# Patient Record
Sex: FEMALE | Race: WHITE
[De-identification: names, ages, dates, MRNs, and addresses within clinical notes are randomized per-mention and may not be internally consistent; named-entity substitution may affect disease eponyms.]

---

## 2022-07-12 NOTE — EKG
[facility]
                                    [address]
                                  [place]  [address]
_________________________________________________________________________________________
                                                                 Signed   
 
 
Normal sinus rhythm
Normal ECG
No previous ECGs available
Confirmed by MICHAEL GAN MD ([id number]) on 7/12/2022 12:56:12 PM
 
 
 
 
 
 
 
 
 
 
 
 
 
 
 
 
 
 
 
 
 
 
 
 
 
 
 
 
 
 
 
 
 
 
 
 
 
 
 
 
 
    Electronically Signed By: MICHAEL GAN MD  07/12/22 [id number]
_________________________________________________________________________________________
PATIENT NAME:     OLIVIER KIRBY                        
MEDICAL RECORD #: [id number]                     Electrocardiogram             
          ACCT #: [id number]  
DATE OF BIRTH:   [birth year]                                       
PHYSICIAN:   MICHAEL GAN MD           REPORT #: [id number]
REPORT IS CONFIDENTIAL AND NOT TO BE RELEASED WITHOUT AUTHORIZATION

## 2023-09-08 VITALS — DIASTOLIC BLOOD PRESSURE: 50 MMHG | SYSTOLIC BLOOD PRESSURE: 126 MMHG

## 2023-09-09 ENCOUNTER — HOSPITAL ENCOUNTER (OUTPATIENT)
Dept: HOSPITAL 46 - ED | Age: 88
Setting detail: OBSERVATION
LOS: 1 days | Discharge: HOME | End: 2023-09-10
Attending: INTERNAL MEDICINE | Admitting: INTERNAL MEDICINE
Payer: MEDICARE

## 2023-09-09 VITALS — SYSTOLIC BLOOD PRESSURE: 133 MMHG | DIASTOLIC BLOOD PRESSURE: 107 MMHG

## 2023-09-09 VITALS — SYSTOLIC BLOOD PRESSURE: 125 MMHG | DIASTOLIC BLOOD PRESSURE: 58 MMHG

## 2023-09-09 VITALS — SYSTOLIC BLOOD PRESSURE: 133 MMHG | DIASTOLIC BLOOD PRESSURE: 51 MMHG

## 2023-09-09 VITALS — DIASTOLIC BLOOD PRESSURE: 46 MMHG | SYSTOLIC BLOOD PRESSURE: 113 MMHG

## 2023-09-09 VITALS — DIASTOLIC BLOOD PRESSURE: 44 MMHG | SYSTOLIC BLOOD PRESSURE: 118 MMHG

## 2023-09-09 VITALS — HEIGHT: 62 IN | WEIGHT: 131.84 LBS | BODY MASS INDEX: 24.26 KG/M2

## 2023-09-09 VITALS — SYSTOLIC BLOOD PRESSURE: 139 MMHG | DIASTOLIC BLOOD PRESSURE: 94 MMHG

## 2023-09-09 VITALS — DIASTOLIC BLOOD PRESSURE: 59 MMHG | SYSTOLIC BLOOD PRESSURE: 127 MMHG

## 2023-09-09 VITALS — SYSTOLIC BLOOD PRESSURE: 126 MMHG | DIASTOLIC BLOOD PRESSURE: 50 MMHG

## 2023-09-09 VITALS — SYSTOLIC BLOOD PRESSURE: 126 MMHG | DIASTOLIC BLOOD PRESSURE: 51 MMHG

## 2023-09-09 DIAGNOSIS — Z20.822: ICD-10-CM

## 2023-09-09 DIAGNOSIS — N18.9: ICD-10-CM

## 2023-09-09 DIAGNOSIS — Z88.0: ICD-10-CM

## 2023-09-09 DIAGNOSIS — E87.1: ICD-10-CM

## 2023-09-09 DIAGNOSIS — E11.22: ICD-10-CM

## 2023-09-09 DIAGNOSIS — N39.0: Primary | ICD-10-CM

## 2023-09-09 DIAGNOSIS — I25.10: ICD-10-CM

## 2023-09-09 DIAGNOSIS — Z95.5: ICD-10-CM

## 2023-09-09 DIAGNOSIS — Z88.5: ICD-10-CM

## 2023-09-09 DIAGNOSIS — Z79.899: ICD-10-CM

## 2023-09-09 DIAGNOSIS — I12.9: ICD-10-CM

## 2023-09-09 LAB
ALBUMIN SERPL-MCNC: 2.9 G/DL (ref 3.4–5)
ALBUMIN/GLOB SERPL: 0.76 {RATIO} (ref 1.1–2.4)
ALP SERPL-CCNC: 75 U/L (ref 46–116)
ALT SERPL W P-5'-P-CCNC: 23 U/L (ref 14–59)
ANION GAP SERPL CALCULATED.4IONS-SCNC: 17 MMOL/L (ref 7–21)
AST SERPL-CCNC: 29 U/L (ref 15–37)
BACTERIA #/AREA URNS HPF: (no result) /HPF
BASOPHILS NFR BLD AUTO: 0.2 % (ref 0–2)
BUN SERPL-MCNC: 15 MG/DL (ref 7–18)
BUN/CREAT SERPL: 12.39 (ref 6–28.6)
CALCIUM SERPL-MCNC: 9.1 MG/DL (ref 8.5–10.1)
CASTS #/AREA URNS LPF: (no result) "LPF"
CHLORIDE SERPL-SCNC: 97 MMOL/L (ref 98–107)
CO2 SERPL-SCNC: 23 MMOL/L (ref 21–32)
CRYSTALS URNS MICRO: (no result)
DEPRECATED RDW RBC AUTO: 13.7 FL (ref 10.5–15)
EGFRCR SERPLBLD CKD-EPI 2021: 44 ML/MIN (ref 60–?)
EOSINOPHIL NFR BLD AUTO: 0.3 % (ref 0–6)
EPI CELLS #/AREA URNS HPF: 0 /LPF
FLUBV RNA RESP QL NAA+PROBE: NEGATIVE
GLOBULIN SER-MCNC: 3.8 G/DL (ref 1.8–3.5)
HCT VFR BLD AUTO: 35.6 % (ref 35–50)
HGB BLD-MCNC: 12 G/DL (ref 12–18)
HGB UR QL STRIP: (no result)
KETONES UR QL STRIP: NEGATIVE
LACTATE SERPL-SCNC: 2.9 MMOL/L (ref 0.4–2)
LEUKOCYTE ESTERASE UR QL STRIP: (no result)
LYMPHOCYTES NFR BLD AUTO: 8 % (ref 24–44)
MAGNESIUM SERPL-MCNC: 1.2 MG/DL (ref 1.8–2.4)
MCH RBC QN AUTO: 30.5 PG (ref 27–36)
MCHC RBC AUTO-ENTMCNC: 33.6 G/DL (ref 30–36)
MCV RBC AUTO: 90.7 FL (ref 81–99)
MONOCYTES NFR BLD AUTO: 1.3 % (ref 0–12)
NEUTROPHILS NFR BLD AUTO: 90.2 % (ref 39–80)
NITRITE UR QL STRIP: POSITIVE
PLATELET # BLD AUTO: 157 K/UL (ref 140–440)
POTASSIUM SERPL-SCNC: 4 MMOL/L (ref 3.5–5.1)
PROT SERPL-MCNC: 6.7 G/DL (ref 6.4–8.2)
RBC # BLD AUTO: 3.93 M/UL (ref 4.3–5.7)
RSV RNA ISLT QL NAA+PROBE: NEGATIVE
URN SPEC COLLECT METH UR: (no result)

## 2023-09-09 PROCEDURE — C9803 HOPD COVID-19 SPEC COLLECT: HCPCS

## 2023-09-09 PROCEDURE — A9270 NON-COVERED ITEM OR SERVICE: HCPCS

## 2023-09-09 PROCEDURE — U0002 COVID-19 LAB TEST NON-CDC: HCPCS

## 2023-09-09 PROCEDURE — G0378 HOSPITAL OBSERVATION PER HR: HCPCS

## 2023-09-10 VITALS — DIASTOLIC BLOOD PRESSURE: 118 MMHG | SYSTOLIC BLOOD PRESSURE: 180 MMHG

## 2023-09-10 VITALS — SYSTOLIC BLOOD PRESSURE: 111 MMHG | DIASTOLIC BLOOD PRESSURE: 41 MMHG

## 2023-09-10 VITALS — DIASTOLIC BLOOD PRESSURE: 64 MMHG | SYSTOLIC BLOOD PRESSURE: 154 MMHG

## 2023-09-10 VITALS — DIASTOLIC BLOOD PRESSURE: 69 MMHG | SYSTOLIC BLOOD PRESSURE: 149 MMHG

## 2023-09-10 VITALS — SYSTOLIC BLOOD PRESSURE: 147 MMHG | DIASTOLIC BLOOD PRESSURE: 60 MMHG

## 2023-09-10 LAB
ANION GAP SERPL CALCULATED.4IONS-SCNC: 13.7 MMOL/L (ref 7–21)
BASOPHILS NFR BLD AUTO: 0.3 % (ref 0–2)
BUN SERPL-MCNC: 18 MG/DL (ref 7–18)
BUN/CREAT SERPL: 15.25 (ref 6–28.6)
CALCIUM SERPL-MCNC: 8.4 MG/DL (ref 8.5–10.1)
CHLORIDE SERPL-SCNC: 100 MMOL/L (ref 98–107)
CO2 SERPL-SCNC: 23 MMOL/L (ref 21–32)
DEPRECATED RDW RBC AUTO: 13.2 FL (ref 10.5–15)
EGFRCR SERPLBLD CKD-EPI 2021: 45 ML/MIN (ref 60–?)
EOSINOPHIL NFR BLD AUTO: 1.3 % (ref 0–6)
HCT VFR BLD AUTO: 30.9 % (ref 35–50)
HGB BLD-MCNC: 10.3 G/DL (ref 12–18)
LYMPHOCYTES NFR BLD AUTO: 11.2 % (ref 24–44)
MCH RBC QN AUTO: 30.1 PG (ref 27–36)
MCHC RBC AUTO-ENTMCNC: 33.4 G/DL (ref 30–36)
MCV RBC AUTO: 89.9 FL (ref 81–99)
MONOCYTES NFR BLD AUTO: 11.8 % (ref 0–12)
NEUTROPHILS NFR BLD AUTO: 75.4 % (ref 39–80)
PLATELET # BLD AUTO: 138 K/UL (ref 140–440)
POTASSIUM SERPL-SCNC: 3.7 MMOL/L (ref 3.5–5.1)
RBC # BLD AUTO: 3.44 M/UL (ref 4.3–5.7)